# Patient Record
Sex: FEMALE | Race: BLACK OR AFRICAN AMERICAN | NOT HISPANIC OR LATINO | Employment: FULL TIME | URBAN - METROPOLITAN AREA
[De-identification: names, ages, dates, MRNs, and addresses within clinical notes are randomized per-mention and may not be internally consistent; named-entity substitution may affect disease eponyms.]

---

## 2024-08-01 ENCOUNTER — APPOINTMENT (OUTPATIENT)
Dept: PRIMARY CARE | Facility: CLINIC | Age: 24
End: 2024-08-01
Payer: COMMERCIAL

## 2024-08-06 ENCOUNTER — APPOINTMENT (OUTPATIENT)
Dept: PRIMARY CARE | Facility: CLINIC | Age: 24
End: 2024-08-06
Payer: COMMERCIAL

## 2024-08-30 ENCOUNTER — LAB (OUTPATIENT)
Dept: LAB | Facility: LAB | Age: 24
End: 2024-08-30
Payer: COMMERCIAL

## 2024-08-30 ENCOUNTER — APPOINTMENT (OUTPATIENT)
Dept: PRIMARY CARE | Facility: CLINIC | Age: 24
End: 2024-08-30
Payer: COMMERCIAL

## 2024-08-30 VITALS
SYSTOLIC BLOOD PRESSURE: 102 MMHG | HEART RATE: 85 BPM | DIASTOLIC BLOOD PRESSURE: 68 MMHG | BODY MASS INDEX: 24.48 KG/M2 | WEIGHT: 156 LBS | HEIGHT: 67 IN

## 2024-08-30 DIAGNOSIS — D64.9 ANEMIA, UNSPECIFIED TYPE: ICD-10-CM

## 2024-08-30 DIAGNOSIS — F41.1 GENERALIZED ANXIETY DISORDER: Primary | ICD-10-CM

## 2024-08-30 DIAGNOSIS — Z13.29 SCREENING FOR THYROID DISORDER: ICD-10-CM

## 2024-08-30 DIAGNOSIS — Z13.1 SCREENING FOR DIABETES MELLITUS: ICD-10-CM

## 2024-08-30 LAB
ANION GAP SERPL CALC-SCNC: 14 MMOL/L (ref 10–20)
BASOPHILS # BLD MANUAL: 0 X10*3/UL (ref 0–0.1)
BASOPHILS NFR BLD MANUAL: 0 %
BUN SERPL-MCNC: 9 MG/DL (ref 6–23)
CALCIUM SERPL-MCNC: 9.8 MG/DL (ref 8.6–10.6)
CHLORIDE SERPL-SCNC: 102 MMOL/L (ref 98–107)
CO2 SERPL-SCNC: 28 MMOL/L (ref 21–32)
CREAT SERPL-MCNC: 0.76 MG/DL (ref 0.5–1.05)
EGFRCR SERPLBLD CKD-EPI 2021: >90 ML/MIN/1.73M*2
EOSINOPHIL # BLD MANUAL: 0.32 X10*3/UL (ref 0–0.7)
EOSINOPHIL NFR BLD MANUAL: 3.5 %
ERYTHROCYTE [DISTWIDTH] IN BLOOD BY AUTOMATED COUNT: 13.5 % (ref 11.5–14.5)
FERRITIN SERPL-MCNC: 28 NG/ML (ref 8–150)
GLUCOSE SERPL-MCNC: 67 MG/DL (ref 74–99)
HCT VFR BLD AUTO: 35 % (ref 36–46)
HGB BLD-MCNC: 11.1 G/DL (ref 12–16)
IMM GRANULOCYTES # BLD AUTO: 0.03 X10*3/UL (ref 0–0.7)
IMM GRANULOCYTES NFR BLD AUTO: 0.3 % (ref 0–0.9)
IRON SATN MFR SERPL: 13 % (ref 25–45)
IRON SERPL-MCNC: 55 UG/DL (ref 35–150)
LYMPHOCYTES # BLD MANUAL: 2.58 X10*3/UL (ref 1.2–4.8)
LYMPHOCYTES NFR BLD MANUAL: 28.7 %
MCH RBC QN AUTO: 24.3 PG (ref 26–34)
MCHC RBC AUTO-ENTMCNC: 31.7 G/DL (ref 32–36)
MCV RBC AUTO: 77 FL (ref 80–100)
MONOCYTES # BLD MANUAL: 0.15 X10*3/UL (ref 0.1–1)
MONOCYTES NFR BLD MANUAL: 1.7 %
NEUTS SEG # BLD MANUAL: 5.55 X10*3/UL (ref 1.2–7)
NEUTS SEG NFR BLD MANUAL: 61.7 %
NRBC BLD-RTO: 0 /100 WBCS (ref 0–0)
PLASMA CELLS # BLD MANUAL: 0.08 X10*3/UL
PLASMA CELLS NFR BLD MANUAL: 0.9 %
PLATELET # BLD AUTO: 364 X10*3/UL (ref 150–450)
POTASSIUM SERPL-SCNC: 4.5 MMOL/L (ref 3.5–5.3)
RBC # BLD AUTO: 4.57 X10*6/UL (ref 4–5.2)
RBC MORPH BLD: NORMAL
SODIUM SERPL-SCNC: 139 MMOL/L (ref 136–145)
TIBC SERPL-MCNC: 414 UG/DL (ref 240–445)
TOTAL CELLS COUNTED BLD: 115
TSH SERPL-ACNC: 1.57 MIU/L (ref 0.44–3.98)
UIBC SERPL-MCNC: 359 UG/DL (ref 110–370)
VARIANT LYMPHS # BLD MANUAL: 0.32 X10*3/UL (ref 0–0.5)
VARIANT LYMPHS NFR BLD: 3.5 %
WBC # BLD AUTO: 9 X10*3/UL (ref 4.4–11.3)

## 2024-08-30 PROCEDURE — 85027 COMPLETE CBC AUTOMATED: CPT

## 2024-08-30 PROCEDURE — 83550 IRON BINDING TEST: CPT

## 2024-08-30 PROCEDURE — 36415 COLL VENOUS BLD VENIPUNCTURE: CPT

## 2024-08-30 PROCEDURE — 85007 BL SMEAR W/DIFF WBC COUNT: CPT

## 2024-08-30 PROCEDURE — 82728 ASSAY OF FERRITIN: CPT

## 2024-08-30 PROCEDURE — 80048 BASIC METABOLIC PNL TOTAL CA: CPT

## 2024-08-30 PROCEDURE — 83540 ASSAY OF IRON: CPT

## 2024-08-30 PROCEDURE — 84443 ASSAY THYROID STIM HORMONE: CPT

## 2024-08-30 RX ORDER — ESCITALOPRAM OXALATE 20 MG/1
20 TABLET ORAL
Qty: 90 TABLET | Refills: 1 | Status: SHIPPED | OUTPATIENT
Start: 2024-08-30

## 2024-08-30 RX ORDER — ESCITALOPRAM OXALATE 20 MG/1
20 TABLET ORAL
COMMUNITY
Start: 2024-05-14 | End: 2024-08-30 | Stop reason: SDUPTHER

## 2024-08-30 ASSESSMENT — PATIENT HEALTH QUESTIONNAIRE - PHQ9
SUM OF ALL RESPONSES TO PHQ9 QUESTIONS 1 AND 2: 0
1. LITTLE INTEREST OR PLEASURE IN DOING THINGS: NOT AT ALL
1. LITTLE INTEREST OR PLEASURE IN DOING THINGS: NOT AT ALL
2. FEELING DOWN, DEPRESSED OR HOPELESS: NOT AT ALL
2. FEELING DOWN, DEPRESSED OR HOPELESS: NOT AT ALL
SUM OF ALL RESPONSES TO PHQ9 QUESTIONS 1 AND 2: 0

## 2024-08-30 ASSESSMENT — ENCOUNTER SYMPTOMS: CONSTITUTIONAL NEGATIVE: 1

## 2024-08-30 NOTE — PROGRESS NOTES
"Patient ID: Mitch Yeager is a 23 y.o. female who presents for New Patient Visit and Annual Exam.    /68   Pulse 85   Ht 1.702 m (5' 7\")   Wt 70.8 kg (156 lb)   BMI 24.43 kg/m²     HPI      I HAVE GENERALIZED ANXIETY   ON ESCITALOPRAM 20MG A DAY   WHICH WORKS , NEED REFILL       I OCCASIONALLY FEELS LIGHTHEADED   I HAVE BEEN TOLD ANEMIC   I GET HEAVY PERIOD         I have     Subjective     Review of Systems   Constitutional: Negative.    All other systems reviewed and are negative.      Objective     Physical Exam  Vitals and nursing note reviewed.   Neck:      Vascular: No carotid bruit.   Cardiovascular:      Rate and Rhythm: Normal rate and regular rhythm.      Pulses: Normal pulses.      Heart sounds: Normal heart sounds. No murmur heard.  Pulmonary:      Effort: Pulmonary effort is normal.      Breath sounds: Normal breath sounds.   Musculoskeletal:      Right lower leg: No edema.      Left lower leg: No edema.   Neurological:      General: No focal deficit present.      Mental Status: She is oriented to person, place, and time. Mental status is at baseline.   Psychiatric:         Mood and Affect: Mood normal.         Behavior: Behavior normal.         Thought Content: Thought content normal.         Judgment: Judgment normal.         Lab Results   Component Value Date    WBC 9.0 08/30/2024    HGB 11.1 (L) 08/30/2024    HCT 35.0 (L) 08/30/2024    MCV 77 (L) 08/30/2024     08/30/2024           Problem List Items Addressed This Visit       Generalized anxiety disorder - Primary    Relevant Medications    escitalopram (Lexapro) 20 mg tablet     Other Visit Diagnoses       Anemia, unspecified type        Relevant Orders    CBC and Auto Differential (Completed)    Ferritin (Completed)    Iron and TIBC (Completed)    Screening for thyroid disorder        Relevant Orders    Tsh With Reflex To Free T4 If Abnormal (Completed)    Screening for diabetes mellitus        Relevant Orders    Basic " metabolic panel (Completed)                 A/P         CBC,BMP,FERRITIN , IRON PANEL TSH   ESCITALOPRAM 20MG 1 PILL EVERY DAY FILLED   FOLLOW IF NEEDED

## 2024-09-30 ENCOUNTER — OFFICE VISIT (OUTPATIENT)
Dept: URGENT CARE | Age: 24
End: 2024-09-30
Payer: COMMERCIAL

## 2024-09-30 VITALS
DIASTOLIC BLOOD PRESSURE: 84 MMHG | WEIGHT: 155 LBS | HEART RATE: 103 BPM | RESPIRATION RATE: 18 BRPM | OXYGEN SATURATION: 96 % | SYSTOLIC BLOOD PRESSURE: 115 MMHG | TEMPERATURE: 98.1 F

## 2024-09-30 DIAGNOSIS — J06.9 UPPER RESPIRATORY INFECTION, ACUTE: ICD-10-CM

## 2024-09-30 DIAGNOSIS — J01.10 ACUTE FRONTAL SINUSITIS, RECURRENCE NOT SPECIFIED: Primary | ICD-10-CM

## 2024-09-30 DIAGNOSIS — R05.9 COUGH IN ADULT: ICD-10-CM

## 2024-09-30 DIAGNOSIS — R06.2 WHEEZING: ICD-10-CM

## 2024-09-30 LAB — POC SARS-COV-2 AG BINAX: NORMAL

## 2024-09-30 PROCEDURE — 99204 OFFICE O/P NEW MOD 45 MIN: CPT | Performed by: NURSE PRACTITIONER

## 2024-09-30 PROCEDURE — 87811 SARS-COV-2 COVID19 W/OPTIC: CPT | Performed by: NURSE PRACTITIONER

## 2024-09-30 RX ORDER — AZITHROMYCIN 250 MG/1
TABLET, FILM COATED ORAL
Qty: 6 TABLET | Refills: 0 | Status: SHIPPED | OUTPATIENT
Start: 2024-09-30 | End: 2024-10-05

## 2024-09-30 RX ORDER — ALBUTEROL SULFATE 90 UG/1
2 INHALANT RESPIRATORY (INHALATION) EVERY 4 HOURS PRN
Qty: 8 G | Refills: 0 | Status: SHIPPED | OUTPATIENT
Start: 2024-09-30 | End: 2025-09-30

## 2024-09-30 RX ORDER — IPRATROPIUM BROMIDE AND ALBUTEROL SULFATE 2.5; .5 MG/3ML; MG/3ML
3 SOLUTION RESPIRATORY (INHALATION) ONCE
Status: SHIPPED | OUTPATIENT
Start: 2024-09-30

## 2024-09-30 RX ORDER — METHYLPREDNISOLONE 4 MG/1
TABLET ORAL
Qty: 21 TABLET | Refills: 0 | Status: SHIPPED | OUTPATIENT
Start: 2024-09-30 | End: 2024-10-07

## 2024-09-30 ASSESSMENT — ENCOUNTER SYMPTOMS
GASTROINTESTINAL NEGATIVE: 1
ALLERGIC/IMMUNOLOGIC NEGATIVE: 1
COUGH: 1
MUSCULOSKELETAL NEGATIVE: 1
CARDIOVASCULAR NEGATIVE: 1
NEUROLOGICAL NEGATIVE: 1
ENDOCRINE NEGATIVE: 1
HEMATOLOGIC/LYMPHATIC NEGATIVE: 1
PSYCHIATRIC NEGATIVE: 1
EYES NEGATIVE: 1
CONSTITUTIONAL NEGATIVE: 1

## 2024-09-30 ASSESSMENT — PAIN SCALES - GENERAL: PAINLEVEL: 0-NO PAIN

## 2024-09-30 NOTE — PROGRESS NOTES
Subjective   Patient ID: Hernesto Raya is a 23 y.o. female. They present today with a chief complaint of Cough (wheezing).    History of Present Illness    Cough  Pertinent negatives include no rash.       Past Medical History  Allergies as of 09/30/2024    (No Known Allergies)       (Not in a hospital admission)       No past medical history on file.    No past surgical history on file.         Review of Systems  Review of Systems   Constitutional: Negative.    HENT: Negative.     Eyes: Negative.    Respiratory:  Positive for cough.    Cardiovascular: Negative.    Gastrointestinal: Negative.    Endocrine: Negative.    Genitourinary: Negative.    Musculoskeletal: Negative.    Skin:  Negative for rash.   Allergic/Immunologic: Negative.    Neurological: Negative.    Hematological: Negative.    Psychiatric/Behavioral: Negative.                                    Objective    Vitals:    09/30/24 1712   BP: 115/84   BP Location: Left arm   Patient Position: Sitting   Pulse: 103   Resp: 18   Temp: 36.7 °C (98.1 °F)   TempSrc: Oral   SpO2: 96%   Weight: 70.3 kg (155 lb)     Patient's last menstrual period was 09/22/2024.    Physical Exam  Constitutional:       Appearance: Normal appearance.   HENT:      Head: Normocephalic.      Right Ear: Tympanic membrane normal.      Left Ear: Tympanic membrane normal.      Nose: Nose normal.      Mouth/Throat:      Mouth: Mucous membranes are dry.      Pharynx: Oropharynx is clear.   Eyes:      Extraocular Movements: Extraocular movements intact.      Pupils: Pupils are equal, round, and reactive to light.   Cardiovascular:      Rate and Rhythm: Normal rate and regular rhythm.      Pulses: Normal pulses.      Heart sounds: Normal heart sounds.   Pulmonary:      Effort: Pulmonary effort is normal.      Breath sounds: Examination of the right-upper field reveals wheezing. Examination of the left-upper field reveals wheezing. Examination of the right-middle field reveals decreased  breath sounds and wheezing. Examination of the left-middle field reveals decreased breath sounds and wheezing. Examination of the right-lower field reveals decreased breath sounds and wheezing. Examination of the left-lower field reveals decreased breath sounds and wheezing. Decreased breath sounds and wheezing present.      Comments: Diffuse wheezing throughout A/P; new symptom for pt  Musculoskeletal:         General: Normal range of motion.      Cervical back: Normal range of motion and neck supple.   Lymphadenopathy:      Cervical: Cervical adenopathy present.   Skin:     General: Skin is warm and dry.   Neurological:      General: No focal deficit present.      Mental Status: She is alert and oriented to person, place, and time.   Psychiatric:         Mood and Affect: Mood normal.         Behavior: Behavior normal.         Procedures    Point of Care Test & Imaging Results from this visit  Results for orders placed or performed in visit on 09/30/24   POCT Covid-19 Rapid Antigen   Result Value Ref Range    POC TRAVIS-COV-2 AG  Presumptive negative test for SARS-CoV-2 (no antigen detected)     Presumptive negative test for SARS-CoV-2 (no antigen detected)      No results found.    Diagnostic study results (if any) were reviewed by STEVEN Santos.    Assessment/Plan   Allergies, medications, history, and pertinent labs/EKGs/Imaging reviewed by STEVEN Santos.     Medical Decision Making  Rapid covid- neg  suspect sinusitis and URI with diffuse wheezing so deoneb was given in UC, d/t clinical feature s/s progression and duration including PE so will treat with z-hector/medrol hector/albuterol PRN, and advised to implement normal saline mist spray, flonase daily, and if s/s persist after 24-48 hours to take a covid test and f/u with PCP    Education provided on f/u with PCP for further evaluation for underlying asthma; PFT test  Albuterol PRN  Drink clear fluids and avoid milk  Normal saline mist  spray 3 times a day to clear out sinuses and reduce PND  Advised to take daily anti-histamines   alternate Tylenol and Motrin PRN for discomfort  gargle with warm water and salt  f/u PCP 2-3 days    SEEK FURTHER TREATMENT IF YOU:  --- You have increasing pain or severe headaches.  --- You have nausea, vomiting, or drowsiness.  --- You have swelling around your face.  --- You have vision problems.  --- You have a stiff neck.  --- You have difficulty breathing.      Orders and Diagnoses  Diagnoses and all orders for this visit:  Acute frontal sinusitis, recurrence not specified  -     ipratropium-albuteroL (Duo-Neb) 0.5-2.5 mg/3 mL nebulizer solution 3 mL  -     azithromycin (Zithromax) 250 mg tablet; Take 2 tabs (500 mg) by mouth today, than 1 daily for 4 days.  -     methylPREDNISolone (Medrol Dospak) 4 mg tablets; Take as directed on package.  -     albuterol (Ventolin HFA) 90 mcg/actuation inhaler; Inhale 2 puffs every 4 hours if needed for wheezing or shortness of breath.  Cough in adult  -     POCT Covid-19 Rapid Antigen  -     ipratropium-albuteroL (Duo-Neb) 0.5-2.5 mg/3 mL nebulizer solution 3 mL  -     azithromycin (Zithromax) 250 mg tablet; Take 2 tabs (500 mg) by mouth today, than 1 daily for 4 days.  -     methylPREDNISolone (Medrol Dospak) 4 mg tablets; Take as directed on package.  -     albuterol (Ventolin HFA) 90 mcg/actuation inhaler; Inhale 2 puffs every 4 hours if needed for wheezing or shortness of breath.      Medical Admin Record      Patient disposition: Home    Electronically signed by STEVEN Santos  5:31 PM

## 2024-09-30 NOTE — LETTER
Date: 2024  RE:  Hernesto Raya  :  2000      To Whom It May Concern:    Our patient, Hernesto, has been under our care and now may return back to work without restrictions.    Their return to work date is:  10/02/2024    If you have questions concerning this patient's immediate care, please feel free to contact our office at [phone].    Sincerely,      Sarah Hinton MA

## 2024-11-01 ENCOUNTER — OFFICE VISIT (OUTPATIENT)
Dept: URGENT CARE | Age: 24
End: 2024-11-01
Payer: COMMERCIAL

## 2024-11-01 VITALS
SYSTOLIC BLOOD PRESSURE: 111 MMHG | TEMPERATURE: 99.9 F | OXYGEN SATURATION: 98 % | WEIGHT: 155 LBS | HEART RATE: 95 BPM | DIASTOLIC BLOOD PRESSURE: 76 MMHG

## 2024-11-01 DIAGNOSIS — J03.90 ACUTE TONSILLITIS, UNSPECIFIED ETIOLOGY: ICD-10-CM

## 2024-11-01 DIAGNOSIS — B27.90 INFECTIOUS MONONUCLEOSIS WITHOUT COMPLICATION, INFECTIOUS MONONUCLEOSIS DUE TO UNSPECIFIED ORGANISM: Primary | ICD-10-CM

## 2024-11-01 LAB
POC RAPID MONO: POSITIVE
POC RAPID STREP: NEGATIVE

## 2024-11-01 RX ORDER — ESCITALOPRAM OXALATE 20 MG/1
1 TABLET ORAL
COMMUNITY
Start: 2024-08-30

## 2024-11-01 RX ORDER — ACETAMINOPHEN 500 MG
1000 TABLET ORAL ONCE
Status: COMPLETED | OUTPATIENT
Start: 2024-11-01 | End: 2024-11-01

## 2024-11-01 RX ORDER — PREDNISONE 20 MG/1
TABLET ORAL
Qty: 10 TABLET | Refills: 0 | Status: SHIPPED | OUTPATIENT
Start: 2024-11-01

## 2024-11-01 ASSESSMENT — PAIN DESCRIPTION - LOCATION: LOCATION: OTHER (COMMENT)

## 2025-03-08 ENCOUNTER — OFFICE VISIT (OUTPATIENT)
Dept: URGENT CARE | Age: 25
End: 2025-03-08
Payer: COMMERCIAL

## 2025-03-08 VITALS
SYSTOLIC BLOOD PRESSURE: 116 MMHG | HEIGHT: 67 IN | HEART RATE: 99 BPM | BODY MASS INDEX: 23.54 KG/M2 | DIASTOLIC BLOOD PRESSURE: 80 MMHG | WEIGHT: 150 LBS | RESPIRATION RATE: 16 BRPM | TEMPERATURE: 97.6 F | OXYGEN SATURATION: 98 %

## 2025-03-08 DIAGNOSIS — L98.9 SKIN ABNORMALITY: Primary | ICD-10-CM

## 2025-03-08 DIAGNOSIS — Z11.3 SCREENING EXAMINATION FOR STI: ICD-10-CM

## 2025-03-08 LAB
POC APPEARANCE, URINE: CLEAR
POC BILIRUBIN, URINE: NEGATIVE
POC BLOOD, URINE: NEGATIVE
POC COLOR, URINE: YELLOW
POC GLUCOSE, URINE: NEGATIVE MG/DL
POC KETONES, URINE: NEGATIVE MG/DL
POC LEUKOCYTES, URINE: NEGATIVE
POC NITRITE,URINE: NEGATIVE
POC PH, URINE: 6 PH
POC PROTEIN, URINE: NEGATIVE MG/DL
POC SPECIFIC GRAVITY, URINE: 1.01
PREGNANCY TEST URINE, POC: NEGATIVE

## 2025-03-08 ASSESSMENT — PATIENT HEALTH QUESTIONNAIRE - PHQ9
2. FEELING DOWN, DEPRESSED OR HOPELESS: NOT AT ALL
SUM OF ALL RESPONSES TO PHQ9 QUESTIONS 1 AND 2: 0
1. LITTLE INTEREST OR PLEASURE IN DOING THINGS: NOT AT ALL

## 2025-03-08 NOTE — PROGRESS NOTES
"Subjective   Patient ID: Hernesto Raya is a 24 y.o. female. They present today with a chief complaint of STI Screening (STI screening, bumps on thigh ).    History of Present Illness  Pt presents to the  for STI screening. She denies vaginal discharge or odor. Pt has been advised we do not complete blood testing and she will need to either see her PCP, go to her OBGYN or to health department for blood work. Advised what we test for at this time; blood work will test for HIV, HSV 1/2, syphilis and acute hepatitis. Her primary concern is rash between her legs that has not gone away for the last month. It does not hurt, the bumps are flesh colored, they do not itch. She has no bumps or lesions to the vaginal area. There is no s/s of infection. No other associated symptoms or concerns to address.     Small, flesh-colored papules on the inner thighs can be caused by various conditions. These may include warts, eczema, keratosis pilaris, or other skin conditions. It is recommended to consult a doctor for proper diagnosis and treatment.          Past Medical History  Allergies as of 03/08/2025    (No Known Allergies)       (Not in a hospital admission)       History reviewed. No pertinent past medical history.    History reviewed. No pertinent surgical history.     reports that she has been smoking cigarettes. She does not have any smokeless tobacco history on file. She reports that she does not currently use alcohol. She reports current drug use. Drug: Marijuana.    Review of Systems  Review of Systems                               Objective    Vitals:    03/08/25 1319   BP: 116/80   Pulse: 99   Resp: 16   Temp: 36.4 °C (97.6 °F)   TempSrc: Oral   SpO2: 98%   Weight: 68 kg (150 lb)   Height: 1.702 m (5' 7\")     Patient's last menstrual period was 03/07/2025.    Physical Exam    Procedures    Point of Care Test & Imaging Results from this visit  Results for orders placed or performed in visit on 03/08/25   POCT " pregnancy, urine manually resulted   Result Value Ref Range    Preg Test, Ur Negative Negative   POCT UA Automated manually resulted   Result Value Ref Range    POC Color, Urine Yellow Straw, Yellow, Light-Yellow    POC Appearance, Urine Clear Clear    POC Glucose, Urine NEGATIVE NEGATIVE mg/dl    POC Bilirubin, Urine NEGATIVE NEGATIVE    POC Ketones, Urine NEGATIVE NEGATIVE mg/dl    POC Specific Gravity, Urine 1.015 1.005 - 1.035    POC Blood, Urine NEGATIVE NEGATIVE    POC PH, Urine 6.0 No Reference Range Established PH    POC Protein, Urine NEGATIVE NEGATIVE mg/dl    Poc Nitrite, Urine NEGATIVE NEGATIVE    POC Leukocytes, Urine NEGATIVE NEGATIVE      No results found.    Diagnostic study results (if any) were reviewed by STEVEN Mcknight.    Assessment/Plan   Allergies, medications, history, and pertinent labs/EKGs/Imaging reviewed by STEVEN Mcknight.     Medical Decision Making  ***    Orders and Diagnoses  Diagnoses and all orders for this visit:  Screening examination for STI  -     POCT pregnancy, urine manually resulted  -     POCT UA Automated manually resulted      Medical Admin Record      Patient disposition: { Disposition:[id number]}    Electronically signed by STEVEN Mcknight  1:52 PM       APRN-CNP.    Assessment/Plan   Allergies, medications, history, and pertinent labs/EKGs/Imaging reviewed by STEVEN Mcknight.     Medical Decision Making  Skin Abnormality:  - Unclear etiology at this time  - Small, flesh-colored papules on the inner thighs can be caused by various conditions. These may include warts, eczema, keratosis pilaris, or other skin conditions; if no improvement in the next few weeks encourage follow-up with dermatology or with PCP for further evaluation  - Appear benign at this time; do not have appearance of warts; do not pick at the areas    STI Screening:  - Specimens sent and will advise patient of results if positive; will treat if positive and encourage no sexual activity until results back, treatment completion if needed and then test of cure (RODRI) is completed  - Strongly encourage safe sex practice and advised of dangers of not doing so  - Encourage yearly pap; encourage yearly blood tests as well for HIV, HSV 1/2, acute hepatitis and Syphilis     Orders and Diagnoses  Diagnoses and all orders for this visit:  Skin abnormality  Screening examination for STI  -     POCT pregnancy, urine manually resulted  -     POCT UA Automated manually resulted  -     C. trachomatis / N. gonorrhoeae, Amplified, Urogenital  -     Trichomonas vaginalis, Amplified  -     Vaginitis Gram Stain For Bacterial Vaginosis + Yeast  Other orders  -     Trichomonas vaginalis, Amplified      Medical Admin Record      Patient disposition: Home    Electronically signed by STEVEN Mcknight  9:36 AM

## 2025-03-09 LAB — BV SCORE VAG QL: NORMAL

## 2025-03-10 LAB
C TRACH RRNA SPEC QL NAA+PROBE: NOT DETECTED
N GONORRHOEA RRNA SPEC QL NAA+PROBE: NOT DETECTED
QUEST GC CT AMPLIFIED (ALWAYS MESSAGE): NORMAL
T VAGINALIS RRNA SPEC QL NAA+PROBE: NOT DETECTED

## 2025-03-13 NOTE — PATIENT INSTRUCTIONS
Skin Abnormality:  - Unclear etiology at this time  - Small, flesh-colored papules on the inner thighs can be caused by various conditions. These may include warts, eczema, keratosis pilaris, or other skin conditions; if no improvement in the next few weeks encourage follow-up with dermatology or with PCP for further evaluation  - Appear benign at this time; do not have appearance of warts; do not pick at the areas    STI Screening:  - Specimens sent and will advise patient of results if positive; will treat if positive and encourage no sexual activity until results back, treatment completion if needed and then test of cure (RODRI) is completed  - Strongly encourage safe sex practice and advised of dangers of not doing so  - Encourage yearly pap; encourage yearly blood tests as well for HIV, HSV 1/2, acute hepatitis and Syphilis